# Patient Record
Sex: FEMALE | Race: WHITE | Employment: OTHER | URBAN - METROPOLITAN AREA
[De-identification: names, ages, dates, MRNs, and addresses within clinical notes are randomized per-mention and may not be internally consistent; named-entity substitution may affect disease eponyms.]

---

## 2018-01-04 PROBLEM — F33.9 RECURRENT DEPRESSION (HCC): Status: ACTIVE | Noted: 2018-01-04

## 2021-03-25 ENCOUNTER — HOSPITAL ENCOUNTER (OUTPATIENT)
Dept: CT IMAGING | Age: 71
Discharge: HOME | End: 2021-03-25
Attending: PHYSICAL MEDICINE & REHABILITATION
Payer: COMMERCIAL

## 2021-03-25 DIAGNOSIS — R51.9 INTRACTABLE HEADACHE, UNSPECIFIED CHRONICITY PATTERN, UNSPECIFIED HEADACHE TYPE: ICD-10-CM

## 2021-03-25 PROCEDURE — 70450 CT HEAD/BRAIN W/O DYE: CPT

## 2022-03-19 PROBLEM — F33.9 RECURRENT DEPRESSION (HCC): Status: ACTIVE | Noted: 2018-01-04

## 2023-12-26 ENCOUNTER — CLINICAL DOCUMENTATION (OUTPATIENT)
Dept: ORTHOPEDIC SURGERY | Age: 73
End: 2023-12-26

## 2024-01-15 ENCOUNTER — OFFICE VISIT (OUTPATIENT)
Dept: ORTHOPEDIC SURGERY | Age: 74
End: 2024-01-15
Payer: COMMERCIAL

## 2024-01-15 DIAGNOSIS — M20.21 HALLUX RIGIDUS OF RIGHT FOOT: ICD-10-CM

## 2024-01-15 DIAGNOSIS — M79.674 PAIN IN RIGHT TOE(S): Primary | ICD-10-CM

## 2024-01-15 PROCEDURE — 99203 OFFICE O/P NEW LOW 30 MIN: CPT | Performed by: ORTHOPAEDIC SURGERY

## 2024-01-15 PROCEDURE — 1123F ACP DISCUSS/DSCN MKR DOCD: CPT | Performed by: ORTHOPAEDIC SURGERY

## 2024-01-15 NOTE — PROGRESS NOTES
Name: Sylvia Valdivia  YOB: 1950  Gender: female  MRN: [id number]    CC: Right big toe pain    HPI:   December 2023: Right big toe pain.  No trauma  01/15/2024: Initial visit: Right big toe pain    ROS/Meds/PSH/PMH/FH/SH: reviewed today    Tobacco:  reports that she has never smoked. She has never used smokeless tobacco.     Physical Examination:  Patient appears to be alert and oriented with acceptable appearance.  No obvious distress or SOB  CV: appears to have acceptable vascular color and capillary refill  Neuro: appears to have mostly intact light touch sensation   Skin: Right first MTP thickening  MS: Standing: Plantigrade: Gait full  Right = mild dorsal first MTP pain with forced dorsiflexion; no pain within 10-40 1st MTP motion    XR: Right: Standing AP lateral oblique foot taken today with retained hardware first metatarsal, healed metatarsal osteotomy; first MTP arthritis; second MTP arthritis; tarsometatarsal arthritis  XR Impression:  As above      Reviewed Test/Records/Documents:   12/16/2013: Bilateral arthritic bunionectomy:  Bilateral 2nd claw toe resections  01/05/2015: Diagnosis: Bilateral hallux rigidus, neuralgia    Injection: Discussed as an option    Assessment:    Right hallux rigidus    Plan:   The patient and I discussed the above assessment. We explored treatment options.     The onset of her pain I believe relates to her hallux rigidus  She finds her situation completely manageable  We discussed future potential injection and/or surgery  She is content watching it at this time    Advanced medical imaging: No indication for MRI scan or CT scan  We discussed care and [place] shoe protection  PT: No indication  Orthotic/prosthetic: No indication for custom insoles but recommend [employer] shoes       Medication - OTC meds prn: Prescribed:  Before taking any pill or using topical medication, I recommend the patient review all listed medication potential side effects via the

## 2024-01-23 ENCOUNTER — OFFICE VISIT (OUTPATIENT)
Dept: ORTHOPEDIC SURGERY | Age: 74
End: 2024-01-23
Payer: COMMERCIAL

## 2024-01-23 DIAGNOSIS — M47.816 SPONDYLOSIS OF LUMBAR REGION WITHOUT MYELOPATHY OR RADICULOPATHY: ICD-10-CM

## 2024-01-23 DIAGNOSIS — M54.2 NECK PAIN: Primary | ICD-10-CM

## 2024-01-23 DIAGNOSIS — M60.9 MYOSITIS, UNSPECIFIED MYOSITIS TYPE, UNSPECIFIED SITE: ICD-10-CM

## 2024-01-23 PROCEDURE — 1123F ACP DISCUSS/DSCN MKR DOCD: CPT | Performed by: PHYSICAL MEDICINE & REHABILITATION

## 2024-01-23 PROCEDURE — 99214 OFFICE O/P EST MOD 30 MIN: CPT | Performed by: PHYSICAL MEDICINE & REHABILITATION

## 2024-01-23 RX ORDER — FAMOTIDINE 20 MG/1
20 TABLET, FILM COATED ORAL
COMMUNITY
Start: 2024-01-03

## 2024-01-23 RX ORDER — BUPROPION HYDROCHLORIDE 300 MG/1
300 TABLET ORAL EVERY MORNING
COMMUNITY
Start: 2024-01-06

## 2024-01-23 RX ORDER — ATORVASTATIN CALCIUM 80 MG/1
TABLET, FILM COATED ORAL
COMMUNITY
Start: 2023-10-27

## 2024-01-23 NOTE — PROGRESS NOTES
lumbar spine films are obtained.  There is slight convex rightward upper lumbar scoliosis.  There is retrolisthesis of L2 on L3 with significant loss of disc space height and osteophytosis at that level.  There is no clear evidence of vertebral compression.  Impression: Degenerative changes above, primarily at L2-L3 interspace.        MAHENDRA MOSS JR, MD

## 2024-02-01 DIAGNOSIS — M47.816 SPONDYLOSIS OF LUMBAR REGION WITHOUT MYELOPATHY OR RADICULOPATHY: Primary | ICD-10-CM

## 2024-02-14 ENCOUNTER — OFFICE VISIT (OUTPATIENT)
Dept: ORTHOPEDIC SURGERY | Age: 74
End: 2024-02-14
Payer: COMMERCIAL

## 2024-02-14 VITALS — HEIGHT: 55 IN | WEIGHT: 160 LBS | BODY MASS INDEX: 37.03 KG/M2

## 2024-02-14 DIAGNOSIS — M54.2 CERVICALGIA: ICD-10-CM

## 2024-02-14 DIAGNOSIS — M60.9 MYOSITIS, UNSPECIFIED MYOSITIS TYPE, UNSPECIFIED SITE: ICD-10-CM

## 2024-02-14 DIAGNOSIS — M47.816 SPONDYLOSIS OF LUMBAR REGION WITHOUT MYELOPATHY OR RADICULOPATHY: Primary | ICD-10-CM

## 2024-02-14 PROCEDURE — 20552 NJX 1/MLT TRIGGER POINT 1/2: CPT | Performed by: PHYSICAL MEDICINE & REHABILITATION

## 2024-02-14 PROCEDURE — 64494 INJ PARAVERT F JNT L/S 2 LEV: CPT | Performed by: PHYSICAL MEDICINE & REHABILITATION

## 2024-02-14 PROCEDURE — 64493 INJ PARAVERT F JNT L/S 1 LEV: CPT | Performed by: PHYSICAL MEDICINE & REHABILITATION

## 2024-02-14 RX ORDER — LORAZEPAM 0.5 MG/1
TABLET ORAL
COMMUNITY
Start: 2023-11-11

## 2024-02-14 RX ORDER — CYCLOSPORINE 0.5 MG/ML
1 EMULSION OPHTHALMIC
COMMUNITY
Start: 2023-09-22

## 2024-02-14 RX ORDER — FLUTICASONE PROPIONATE 50 MCG
1 SPRAY, SUSPENSION (ML) NASAL 2 TIMES DAILY
COMMUNITY
Start: 2023-07-31

## 2024-02-14 RX ORDER — DESVENLAFAXINE 100 MG/1
TABLET, EXTENDED RELEASE ORAL
COMMUNITY
Start: 2023-11-28 | End: 2024-03-05

## 2024-02-14 RX ORDER — TRIAMCINOLONE ACETONIDE 40 MG/ML
320 INJECTION, SUSPENSION INTRA-ARTICULAR; INTRAMUSCULAR ONCE
Status: COMPLETED | OUTPATIENT
Start: 2024-02-14 | End: 2024-02-14

## 2024-02-14 RX ADMIN — TRIAMCINOLONE ACETONIDE 320 MG: 40 INJECTION, SUSPENSION INTRA-ARTICULAR; INTRAMUSCULAR at 13:52

## 2024-02-14 NOTE — PROGRESS NOTES
Date: 02/14/24   Name: Sylvia Valdivia    Pre-Procedural Diagnosis:    Diagnosis Orders   1. Spondylosis of lumbar region without myelopathy or radiculopathy  INJECT TRIGGER POINT, 1 OR 2    triamcinolone acetonide (KENALOG-40) injection 320 mg    FL INJ LUMB/SAC FACET SINGLE LEVEL    XR INJ FACET LUMB SACRAL 2ND LVL      2. Cervicalgia        3. Myositis, unspecified myositis type, unspecified site        Has bilateral lumbar psp pain now.    Procedure: Facet Joint Injection (2 levels) with trigger point injections    Precautions: [facility] Precautions spine injections: None.  Patient denies any prior sensitivity to steroid, local anesthetic, contrast dye, iodine or shellfish.    The procedure was discussed at length with the patient and informed consent was signed. The patient was placed in a prone position on the fluoroscopy table and the skin was prepped and draped in a routine sterile fashion. The areas to be injected were each anesthetized with approximately 2-3 cc of 1% Lidocaine. Initially a 22-gauge 3.5 inch inch spinal needle was carefully advanced under fluoroscopic guidance to the bilateral L4-L5 and L5-S1 facet joint spaces. 1 cc of 0.25% Marcaine and 50 mg of  Kenalog were injected through the spinal needle at each site.     After informed oral consent, patient was prepped per routine. The bilateral upper cervical paraspinal area(s) were injected. 60 mg of Kenalog with 1 cc of 0.25% Marcaine injected at each site. Patient tolerated well. Band aid(s) applied.      Fluoroscopic guidance was used intermittently over a 10-minute period to insure proper needle placement and patient safety. A hard copy of the fluoroscopic  images has been placed in the patient's chart. The patient was monitored after the procedure and discharged home without complication.     A total of two muscles/sites injected today for trigger point charge.    A total of 2 cc of Kenalog were administered during this procedure.    Resume

## 2024-02-16 ENCOUNTER — TELEPHONE (OUTPATIENT)
Dept: ORTHOPEDIC SURGERY | Age: 74
End: 2024-02-16

## 2025-04-17 ENCOUNTER — OFFICE VISIT (OUTPATIENT)
Dept: ORTHOPEDIC SURGERY | Age: 75
End: 2025-04-17
Payer: MEDICARE

## 2025-04-17 DIAGNOSIS — M79.10 MYALGIA: ICD-10-CM

## 2025-04-17 DIAGNOSIS — M54.50 LUMBAR BACK PAIN: ICD-10-CM

## 2025-04-17 DIAGNOSIS — M47.816 SPONDYLOSIS OF LUMBAR REGION WITHOUT MYELOPATHY OR RADICULOPATHY: Primary | ICD-10-CM

## 2025-04-17 DIAGNOSIS — M54.2 CERVICALGIA: ICD-10-CM

## 2025-04-17 PROCEDURE — G8428 CUR MEDS NOT DOCUMENT: HCPCS | Performed by: PHYSICAL MEDICINE & REHABILITATION

## 2025-04-17 PROCEDURE — 1036F TOBACCO NON-USER: CPT | Performed by: PHYSICAL MEDICINE & REHABILITATION

## 2025-04-17 PROCEDURE — 1123F ACP DISCUSS/DSCN MKR DOCD: CPT | Performed by: PHYSICAL MEDICINE & REHABILITATION

## 2025-04-17 PROCEDURE — 99213 OFFICE O/P EST LOW 20 MIN: CPT | Performed by: PHYSICAL MEDICINE & REHABILITATION

## 2025-04-17 PROCEDURE — 20552 NJX 1/MLT TRIGGER POINT 1/2: CPT | Performed by: PHYSICAL MEDICINE & REHABILITATION

## 2025-04-17 PROCEDURE — 1090F PRES/ABSN URINE INCON ASSESS: CPT | Performed by: PHYSICAL MEDICINE & REHABILITATION

## 2025-04-17 PROCEDURE — G8399 PT W/DXA RESULTS DOCUMENT: HCPCS | Performed by: PHYSICAL MEDICINE & REHABILITATION

## 2025-04-17 PROCEDURE — 3017F COLORECTAL CA SCREEN DOC REV: CPT | Performed by: PHYSICAL MEDICINE & REHABILITATION

## 2025-04-17 PROCEDURE — G8421 BMI NOT CALCULATED: HCPCS | Performed by: PHYSICAL MEDICINE & REHABILITATION

## 2025-04-17 RX ORDER — TRIAMCINOLONE ACETONIDE 40 MG/ML
40 INJECTION, SUSPENSION INTRA-ARTICULAR; INTRAMUSCULAR ONCE
Status: COMPLETED | OUTPATIENT
Start: 2025-04-17 | End: 2025-04-17

## 2025-04-17 RX ADMIN — TRIAMCINOLONE ACETONIDE 40 MG: 40 INJECTION, SUSPENSION INTRA-ARTICULAR; INTRAMUSCULAR at 10:57

## 2025-04-17 NOTE — PROGRESS NOTES
Date:  04/17/25      Diagnosis Orders   1. Cervicalgia  INJECT TRIGGER POINT, 1 OR 2    triamcinolone acetonide (KENALOG-40) injection 40 mg      2. Myalgia  INJECT TRIGGER POINT, 1 OR 2    triamcinolone acetonide (KENALOG-40) injection 40 mg      3. Lumbar back pain        4. Spondylosis of lumbar region without myelopathy or radiculopathy            HPI:  I am seeing Sylvia Valdivia in evalution/folowup.   I saw her most recently in the office setting in January of last year.  In the past she has had pain in the cervical paraspinal areas and trapezii.  MR imaging of cervical spine revealed degenerative changes, not necessarily thing of a surgical nature.  This was several years ago.  She has had pain upper cervical paraspinal area more so on the left.  Trigger point injection over a year ago had a nice benefit for over 6 months or so.  The pain has recurred in a similar location, upper cervical paraspinal area.  No radiating symptoms.  Symptoms low bit worse if she turns her head to the left.    She also has had pain in the lower lumbar paraspinal areas, sometimes more left than right.  Nonradiating pain, dull.  Facet injections helped in the past and these were most recently performed in February of last year and offered significant benefit for over 8 months.  She feels the pain is starting to come back in a similar fashion.  She notes the pain more in the mornings and if she does any type of bending or twisting.  No radiating symptoms and no neurologic issues in the lower extremities.    ROS:.  No new problems to report    PE: Alert and cooperative.  Good strength lower extremities.  No lateralizing sensory findings.  No pathologically heightened reflexes.  She has mild tenderness lower lumbar paraspinal areas, accentuated by extension.  She also has tenderness in the her left upper cervical paraspinal area.    Assessment/Plan:     PREDOMINANTLY MUSCULOSKELETAL LUMBOSACRAL SPINE PAIN.  Facet joint mediated

## 2025-04-30 ENCOUNTER — OFFICE VISIT (OUTPATIENT)
Dept: ORTHOPEDIC SURGERY | Age: 75
End: 2025-04-30
Payer: MEDICARE

## 2025-04-30 DIAGNOSIS — M47.816 SPONDYLOSIS OF LUMBAR REGION WITHOUT MYELOPATHY OR RADICULOPATHY: Primary | ICD-10-CM

## 2025-04-30 DIAGNOSIS — M79.10 MYALGIA: ICD-10-CM

## 2025-04-30 DIAGNOSIS — M54.2 CERVICALGIA: ICD-10-CM

## 2025-04-30 PROCEDURE — 64494 INJ PARAVERT F JNT L/S 2 LEV: CPT | Performed by: PHYSICAL MEDICINE & REHABILITATION

## 2025-04-30 PROCEDURE — 20552 NJX 1/MLT TRIGGER POINT 1/2: CPT | Performed by: PHYSICAL MEDICINE & REHABILITATION

## 2025-04-30 PROCEDURE — 64493 INJ PARAVERT F JNT L/S 1 LEV: CPT | Performed by: PHYSICAL MEDICINE & REHABILITATION

## 2025-04-30 RX ORDER — TRIAMCINOLONE ACETONIDE 40 MG/ML
40 INJECTION, SUSPENSION INTRA-ARTICULAR; INTRAMUSCULAR ONCE
Status: COMPLETED | OUTPATIENT
Start: 2025-04-30 | End: 2025-04-30

## 2025-04-30 RX ADMIN — TRIAMCINOLONE ACETONIDE 40 MG: 40 INJECTION, SUSPENSION INTRA-ARTICULAR; INTRAMUSCULAR at 10:27

## 2025-04-30 NOTE — PROGRESS NOTES
Date: 04/30/25   Name: Sylvia Valdivia    Pre-Procedural Diagnosis:    Diagnosis Orders   1. Spondylosis of lumbar region without myelopathy or radiculopathy  FL INJ LUMB/SAC FACET SINGLE LEVEL    XR INJ FACET LUMB SACRAL 2ND LVL    INJECT TRIGGER POINT, 1 OR 2    triamcinolone acetonide (KENALOG-40) injection 40 mg      2. Myalgia  FL INJ LUMB/SAC FACET SINGLE LEVEL    XR INJ FACET LUMB SACRAL 2ND LVL    INJECT TRIGGER POINT, 1 OR 2    triamcinolone acetonide (KENALOG-40) injection 40 mg      3. Cervicalgia  FL INJ LUMB/SAC FACET SINGLE LEVEL    XR INJ FACET LUMB SACRAL 2ND LVL    INJECT TRIGGER POINT, 1 OR 2    triamcinolone acetonide (KENALOG-40) injection 40 mg          Procedure: Bilateral Facet Joint Injections (2 levels) with trigger point injections    I have reviewed prior lumbar spine radiographs that reveal 5 non rib-bearing lumbar vertebrae. and I have personally reviewed with patient the informed consent for operation/procedure per [facility] protocol.  This involves risks and benefits of procedure, potential for success/improvement of injections,qualifications of physician performing procedure.  Consent form addressed appropriate local anesthesia.  This form was signed by all appropriate parties and scanned into the medical record. Note that is not appropriate for me to discuss spine surgical issues or other treatment options if I am not the primary clinician.  If I am the ordering clinician, those issues would have been discussed at the appropriate office visit or at upcoming encounter.     Precautions: [facility] Precautions spine injections: None.  Patient denies any prior sensitivity to steroid, local anesthetic, contrast dye, iodine or shellfish.    The procedure was discussed at length with the patient and informed consent was signed. The patient was placed in a prone position on the fluoroscopy table and the skin was prepped and draped in a routine sterile fashion. The areas to be injected were

## 2025-06-11 ENCOUNTER — TELEPHONE (OUTPATIENT)
Dept: ORTHOPEDIC SURGERY | Age: 75
End: 2025-06-11

## 2025-06-11 DIAGNOSIS — M47.816 SPONDYLOSIS OF LUMBAR REGION WITHOUT MYELOPATHY OR RADICULOPATHY: Primary | ICD-10-CM

## 2025-06-18 ENCOUNTER — OFFICE VISIT (OUTPATIENT)
Dept: ORTHOPEDIC SURGERY | Age: 75
End: 2025-06-18

## 2025-06-18 DIAGNOSIS — M54.17 LUMBOSACRAL RADICULOPATHY: Primary | ICD-10-CM

## 2025-06-18 DIAGNOSIS — M54.2 CERVICALGIA: ICD-10-CM

## 2025-06-18 DIAGNOSIS — M47.816 SPONDYLOSIS OF LUMBAR REGION WITHOUT MYELOPATHY OR RADICULOPATHY: ICD-10-CM

## 2025-06-18 DIAGNOSIS — M79.10 MYALGIA, UNSPECIFIED SITE: ICD-10-CM

## 2025-06-18 DIAGNOSIS — M54.50 LUMBAR BACK PAIN: Primary | ICD-10-CM

## 2025-06-18 RX ORDER — TRAMADOL HYDROCHLORIDE 50 MG/1
50 TABLET ORAL 2 TIMES DAILY PRN
Qty: 30 TABLET | Refills: 0 | Status: SHIPPED | OUTPATIENT
Start: 2025-06-18 | End: 2025-07-18

## 2025-06-18 RX ORDER — TRIAMCINOLONE ACETONIDE 40 MG/ML
40 INJECTION, SUSPENSION INTRA-ARTICULAR; INTRAMUSCULAR ONCE
Status: COMPLETED | OUTPATIENT
Start: 2025-06-18 | End: 2025-06-18

## 2025-06-18 RX ORDER — TRIAMCINOLONE ACETONIDE 40 MG/ML
40 INJECTION, SUSPENSION INTRA-ARTICULAR; INTRAMUSCULAR ONCE
Status: SHIPPED | OUTPATIENT
Start: 2025-06-18

## 2025-06-18 RX ORDER — TRAMADOL HYDROCHLORIDE 50 MG/1
50 TABLET ORAL 2 TIMES DAILY PRN
Qty: 30 TABLET | Refills: 0 | Status: CANCELLED | OUTPATIENT
Start: 2025-06-18 | End: 2025-07-18

## 2025-06-18 RX ADMIN — TRIAMCINOLONE ACETONIDE 40 MG: 40 INJECTION, SUSPENSION INTRA-ARTICULAR; INTRAMUSCULAR at 14:27

## 2025-06-18 NOTE — PROGRESS NOTES
Date:  06/18/25     HPI:  I am seeing Sylvia Valdivia in evalution/folowup.  I saw her most recently in the office setting 2 months ago.  Full details that note but basically has had some pain in the upper cervical paraspinal area, more on the left.  Trigger point injections have had some benefit, this most recently performed on April 30.    She has had pain in the lower lumbar paraspinal areas and bilateral lumbar facet joint injections in late April to have offered benefit.  Current time she is actually doing pretty well with these issues.    For about the last week or so, without accident or injury, she has developed significant pain in the right buttock and lateral hip area that gravitates down the lateral aspect of the right leg to the calf area.  There is not really an anterior posterior component.  Does not pain is worse with activities and better with rest.  There is no weakness numbness or tingling with this.  The left leg is unaffected.    Pain scale can get up to 8/10.    ROS: As above    PE: Alert and cooperative.  Good strength in the lower extremities with exception of very slightly depressed dorsiflexion and plantarflexion on the right, somewhat nonspecific today.  No lateralizing findings.  Good range of motion of the hips.  She has tenderness only mildly so in the right lateral buttock area.    Assessment/Plan:       PREDOMINANTLY MUSCULOSKELETAL LUMBOSACRAL  SPINE PAIN.  Her facet joint mediated pain is doing pretty well.  In actuality she was scheduled today for facet injections, so we can cancel those since she is doing well..  She is having significant pain in a right L5 distribution, so I would recommend a right L5 selective nerve root block instead.  If she does not respond to these injections could consider further spine imaging.  She has requested something for pain so I can give her some tramadol, discussed side effects include the rare chance of seizure as well as serotonin

## 2025-06-18 NOTE — PROGRESS NOTES
Date: 06/18/25   Name: Sylvia Valdivia    Pre-Procedural Diagnosis:    Diagnosis Orders   1. Lumbar radiculopathy            Procedure: Selective Nerve Root Blocks (Transforaminal) - Single Level    I have reviewed prior lumbar spine radiographs that reveal 5 non rib-bearing lumbar vertebrae., I have verbally confirmed side of symptomatology with patient., and I have personally reviewed with patient the informed consent for operation/procedure per [facility] protocol.  This involves risks and benefits of procedure, potential for success/improvement of injections,qualifications of physician performing procedure.  Consent form addressed appropriate local anesthesia.  This form was signed by all appropriate parties and scanned into the medical record. Note that is not appropriate for me to discuss spine surgical issues or other treatment options if I am not the primary clinician.  If I am the ordering clinician, those issues would have been discussed at the appropriate office visit or at upcoming encounter.     Precautions: [facility] Precautions spine injections: None.  Patient denies any prior sensitivity to steroid, local anesthetic, contrast dye, iodine or shellfish.    The procedure was discussed at length with the patient and informed consent was signed. The patient was placed in a prone position on the fluoroscopy table and the skin was prepped and draped in a routine sterile fashion. The areas to be injected was/were anesthetized with approximately 5 cc of 1% Lidocaine. A 22-gauge 3.5 inch spinal needle was carefully advanced under fluoroscopic guidance to the right L5 transforaminal space(s). At this time 0.25 cc of omnipaque administered. Once proper placement was confirmed, 1 cc of 0.25% Marcaine and 80 mg of Kenalog were injected through the spinal needle at that/each site.     Fluoroscopic guidance was used intermittently over a 10-minute period to insure proper needle placement and patient safety. A hard

## 2025-06-20 NOTE — PROGRESS NOTES
This was a duplicate note.  Please refer to the other note on same date of service for clinical information, etc.

## 2025-07-11 DIAGNOSIS — M54.17 LUMBOSACRAL RADICULOPATHY: Primary | ICD-10-CM

## 2025-07-11 DIAGNOSIS — M47.816 SPONDYLOSIS OF LUMBAR REGION WITHOUT MYELOPATHY OR RADICULOPATHY: ICD-10-CM

## 2025-07-16 DIAGNOSIS — M54.17 LUMBOSACRAL RADICULOPATHY: ICD-10-CM

## 2025-07-16 RX ORDER — TRAMADOL HYDROCHLORIDE 50 MG/1
50 TABLET ORAL 2 TIMES DAILY PRN
Qty: 30 TABLET | Refills: 0 | Status: SHIPPED | OUTPATIENT
Start: 2025-07-16 | End: 2025-08-15

## 2025-07-24 ENCOUNTER — TELEPHONE (OUTPATIENT)
Dept: ORTHOPEDIC SURGERY | Age: 75
End: 2025-07-24

## 2025-07-24 NOTE — TELEPHONE ENCOUNTER
Reviewed MRI lumbar spine results with patient.  Clinically she has had some symptoms in a right L5 distribution.  I was unimpressed and she was having further difficulties so we did obtain an MRI of the lumbar spine reveals degenerative changes, varying degrees of neuroforaminal narrowing that could explain some of the symptoms.  She tells me today that actually things are doing better now and she does not feel any further injections need to be performed.  She may have some pain in one of the paraspinal areas that she can live with.  Potentially there could be a spine surgical lesion if it comes to that clinically.  Right now looks like we can sit tight and I can reinject as needed.

## 2025-08-18 DIAGNOSIS — M54.17 LUMBOSACRAL RADICULOPATHY: Primary | ICD-10-CM

## 2025-08-18 RX ORDER — TRAMADOL HYDROCHLORIDE 50 MG/1
50 TABLET ORAL 2 TIMES DAILY PRN
Qty: 30 TABLET | Refills: 0 | Status: SHIPPED | OUTPATIENT
Start: 2025-08-18 | End: 2025-09-17